# Patient Record
Sex: FEMALE | URBAN - METROPOLITAN AREA
[De-identification: names, ages, dates, MRNs, and addresses within clinical notes are randomized per-mention and may not be internally consistent; named-entity substitution may affect disease eponyms.]

---

## 2020-11-04 ENCOUNTER — APPOINTMENT (RX ONLY)
Dept: URBAN - METROPOLITAN AREA CLINIC 55 | Facility: CLINIC | Age: 30
Setting detail: DERMATOLOGY
End: 2020-11-04

## 2020-11-04 DIAGNOSIS — Z41.9 ENCOUNTER FOR PROCEDURE FOR PURPOSES OTHER THAN REMEDYING HEALTH STATE, UNSPECIFIED: ICD-10-CM

## 2020-11-04 PROCEDURE — ? DYSPORT

## 2020-11-04 NOTE — PROCEDURE: DYSPORT
Depressor Anguli Oris Units: 0
Show Right And Left Periorbital Units: No
Show Levator Superior Units: Yes
Lot #: [id number]
Detail Level: Detailed
Price (Use Numbers Only, No Special Characters Or $): 120
Glabellar Complex Units: 15
Dilution (U/0.1 Cc): 10
Expiration Date (Month Year): 6/30/21
Post-Care Instructions: Patient instructed to not lie down for 4 hours and limit physical activity for 24 hours. Follow up 7 days if needed.
Consent: Written consent obtained. Risks include but not limited to lid/brow ptosis, bruising, swelling, diplopia, temporary effect, incomplete chemical denervation.

## 2021-03-04 ENCOUNTER — APPOINTMENT (RX ONLY)
Dept: URBAN - METROPOLITAN AREA CLINIC 55 | Facility: CLINIC | Age: 31
Setting detail: DERMATOLOGY
End: 2021-03-04

## 2021-03-04 DIAGNOSIS — Z41.9 ENCOUNTER FOR PROCEDURE FOR PURPOSES OTHER THAN REMEDYING HEALTH STATE, UNSPECIFIED: ICD-10-CM

## 2021-03-04 PROCEDURE — ? DYSPORT

## 2021-03-04 NOTE — PROCEDURE: DYSPORT
Post-Care Instructions: Patient instructed to not lie down for 4 hours and limit physical activity for 24 hours. Follow up 7 days if needed.
Show Additional Area 5: Yes
Forehead Units: 10
Mentalis Units: 0
Expiration Date (Month Year): 9/30/2021
Lot #: [id number]
Detail Level: Detailed
Show Ucl Units: No
Consent: Written consent obtained. Risks include but not limited to lid/brow ptosis, bruising, swelling, diplopia, temporary effect, incomplete chemical denervation.
Additional Area 1 Location: lip
Glabellar Complex Units: 15

## 2021-06-10 ENCOUNTER — APPOINTMENT (RX ONLY)
Dept: URBAN - METROPOLITAN AREA CLINIC 55 | Facility: CLINIC | Age: 31
Setting detail: DERMATOLOGY
End: 2021-06-10

## 2021-06-10 DIAGNOSIS — Z41.9 ENCOUNTER FOR PROCEDURE FOR PURPOSES OTHER THAN REMEDYING HEALTH STATE, UNSPECIFIED: ICD-10-CM

## 2021-06-10 PROCEDURE — ? DYSPORT

## 2021-06-10 NOTE — PROCEDURE: DYSPORT
Additional Area 4 Units: 0
Show Right And Left Periorbital Units: No
Show Additional Area 5: Yes
Consent: Written consent obtained. Risks include but not limited to lid/brow ptosis, bruising, swelling, diplopia, temporary effect, incomplete chemical denervation.
Glabellar Complex Units: 15
Post-Care Instructions: Patient instructed to not lie down for 4 hours and limit physical activity for 24 hours. Follow up 7 days if needed.
Price (Use Numbers Only, No Special Characters Or $): 120
Dilution (U/0.1 Cc): 10
Expiration Date (Month Year): 2/28/2022
Lot #: [id number]
Additional Area 1 Location: lip
Detail Level: Detailed

## 2021-09-07 ENCOUNTER — APPOINTMENT (RX ONLY)
Dept: URBAN - METROPOLITAN AREA CLINIC 55 | Facility: CLINIC | Age: 31
Setting detail: DERMATOLOGY
End: 2021-09-07

## 2021-09-07 DIAGNOSIS — Z41.9 ENCOUNTER FOR PROCEDURE FOR PURPOSES OTHER THAN REMEDYING HEALTH STATE, UNSPECIFIED: ICD-10-CM

## 2021-09-07 PROCEDURE — ? DYSPORT

## 2021-09-07 NOTE — PROCEDURE: DYSPORT
Additional Area 5 Units: 0
Forehead Units: 10
Glabellar Complex Units: 15
Show Right And Left Brow Units: No
Additional Area 1 Location: lip
Show Additional Area 4: Yes
Expiration Date (Month Year): 5/31/2022
Detail Level: Detailed
Consent: Written consent obtained. Risks include but not limited to lid/brow ptosis, bruising, swelling, diplopia, temporary effect, incomplete chemical denervation.
Post-Care Instructions: Patient instructed to not lie down for 4 hours and limit physical activity for 24 hours. Follow up 7 days if needed.
Price (Use Numbers Only, No Special Characters Or $): 120
Lot #: [id number]

## 2021-11-04 ENCOUNTER — APPOINTMENT (RX ONLY)
Dept: URBAN - METROPOLITAN AREA CLINIC 55 | Facility: CLINIC | Age: 31
Setting detail: DERMATOLOGY
End: 2021-11-04

## 2021-11-04 DIAGNOSIS — Z41.9 ENCOUNTER FOR PROCEDURE FOR PURPOSES OTHER THAN REMEDYING HEALTH STATE, UNSPECIFIED: ICD-10-CM

## 2021-11-04 PROCEDURE — ? DYSPORT

## 2021-11-04 NOTE — PROCEDURE: DYSPORT
Show Additional Area 2: Yes
Additional Area 6 Units: 0
Show Lcl Units: No
Post-Care Instructions: Patient instructed to not lie down for 4 hours and limit physical activity for 24 hours. Follow up 7 days if needed.
Forehead Units: 10
Expiration Date (Month Year): 4/30/2022
Detail Level: Detailed
Consent: Written consent obtained. Risks include but not limited to lid/brow ptosis, bruising, swelling, diplopia, temporary effect, incomplete chemical denervation.
Additional Area 1 Location: lip
Glabellar Complex Units: 15
Lot #: [id number]
Price (Use Numbers Only, No Special Characters Or $): 120

## 2022-01-12 ENCOUNTER — APPOINTMENT (RX ONLY)
Dept: URBAN - METROPOLITAN AREA CLINIC 55 | Facility: CLINIC | Age: 32
Setting detail: DERMATOLOGY
End: 2022-01-12

## 2022-01-12 DIAGNOSIS — Z41.9 ENCOUNTER FOR PROCEDURE FOR PURPOSES OTHER THAN REMEDYING HEALTH STATE, UNSPECIFIED: ICD-10-CM

## 2022-01-12 PROCEDURE — ? DYSPORT

## 2022-01-12 NOTE — PROCEDURE: DYSPORT
Show Additional Area 3: Yes
R Brow Units: 0
Price (Use Numbers Only, No Special Characters Or $): 120
Show Ucl Units: No
Glabellar Complex Units: 15
Lot #: [id number]
Detail Level: Detailed
Post-Care Instructions: Patient instructed to not lie down for 4 hours and limit physical activity for 24 hours. Follow up 7 days if needed.
Dilution (U/0.1 Cc): 10
Consent: Written consent obtained. Risks include but not limited to lid/brow ptosis, bruising, swelling, diplopia, temporary effect, incomplete chemical denervation.
Additional Area 1 Location: lip
Expiration Date (Month Year): 9/22

## 2022-03-21 ENCOUNTER — APPOINTMENT (RX ONLY)
Dept: URBAN - METROPOLITAN AREA CLINIC 55 | Facility: CLINIC | Age: 32
Setting detail: DERMATOLOGY
End: 2022-03-21

## 2022-03-21 DIAGNOSIS — Z41.9 ENCOUNTER FOR PROCEDURE FOR PURPOSES OTHER THAN REMEDYING HEALTH STATE, UNSPECIFIED: ICD-10-CM

## 2022-03-21 PROCEDURE — ? DYSPORT

## 2022-03-21 NOTE — PROCEDURE: DYSPORT
Show Depressor Anguli Units: Yes
Additional Area 5 Units: 0
Show Ucl Units: No
Price (Use Numbers Only, No Special Characters Or $): 120
Forehead Units: 10
Detail Level: Detailed
Additional Area 1 Location: lip
Post-Care Instructions: Patient instructed to not lie down for 4 hours and limit physical activity for 24 hours. Follow up 7 days if needed.
Expiration Date (Month Year): 11/22
Lot #: [id number]
Glabellar Complex Units: 15
Consent: Written consent obtained. Risks include but not limited to lid/brow ptosis, bruising, swelling, diplopia, temporary effect, incomplete chemical denervation.

## 2022-05-05 ENCOUNTER — APPOINTMENT (RX ONLY)
Dept: URBAN - METROPOLITAN AREA CLINIC 58 | Facility: CLINIC | Age: 32
Setting detail: DERMATOLOGY
End: 2022-05-05

## 2022-05-05 DIAGNOSIS — L50.1 IDIOPATHIC URTICARIA: ICD-10-CM | Status: INADEQUATELY CONTROLLED

## 2022-05-05 PROCEDURE — ? COUNSELING

## 2022-05-05 PROCEDURE — 99203 OFFICE O/P NEW LOW 30 MIN: CPT

## 2022-05-05 PROCEDURE — ? TREATMENT REGIMEN

## 2022-05-05 ASSESSMENT — LOCATION ZONE DERM
LOCATION ZONE: LEG
LOCATION ZONE: ARM
LOCATION ZONE: HAND
LOCATION ZONE: NECK
LOCATION ZONE: TRUNK

## 2022-05-05 ASSESSMENT — LOCATION SIMPLE DESCRIPTION DERM
LOCATION SIMPLE: RIGHT FOREARM
LOCATION SIMPLE: LEFT FOREARM
LOCATION SIMPLE: LEFT HAND
LOCATION SIMPLE: ABDOMEN
LOCATION SIMPLE: RIGHT POSTERIOR THIGH
LOCATION SIMPLE: RIGHT HAND
LOCATION SIMPLE: LOWER BACK
LOCATION SIMPLE: CHEST
LOCATION SIMPLE: TRAPEZIAL NECK
LOCATION SIMPLE: UPPER BACK
LOCATION SIMPLE: LEFT POSTERIOR THIGH

## 2022-05-05 ASSESSMENT — LOCATION DETAILED DESCRIPTION DERM
LOCATION DETAILED: RIGHT DISTAL DORSAL FOREARM
LOCATION DETAILED: INFERIOR THORACIC SPINE
LOCATION DETAILED: PERIUMBILICAL SKIN
LOCATION DETAILED: RIGHT MEDIAL SUPERIOR CHEST
LOCATION DETAILED: RIGHT DISTAL LATERAL POSTERIOR THIGH
LOCATION DETAILED: LEFT MEDIAL SUPERIOR CHEST
LOCATION DETAILED: SUPERIOR LUMBAR SPINE
LOCATION DETAILED: LEFT DISTAL DORSAL FOREARM
LOCATION DETAILED: RIGHT RADIAL DORSAL HAND
LOCATION DETAILED: MID TRAPEZIAL NECK
LOCATION DETAILED: LEFT DISTAL POSTERIOR THIGH
LOCATION DETAILED: LEFT RADIAL DORSAL HAND

## 2022-05-05 NOTE — HPI: RASH
How Severe Is Your Rash?: moderate
Is This A New Presentation, Or A Follow-Up?: Rash
[Negative] : Heme/Lymph

## 2022-05-05 NOTE — PROCEDURE: TREATMENT REGIMEN
Otc Regimen: Start Allegra twice daily \\nFamotodine twice daily \\nSarna anti itch lotion with menthol, can apply as many times as needed
Detail Level: Zone
Discontinue Regimen: Steroids at this time
Plan: If she does not improve with conventional measures, will order blood tests \\nCall with update Monday\\nFollow up in 10 days
Continue Regimen: Restart TAC 0.1% cream twice daily to rashed areas

## 2022-05-11 ENCOUNTER — APPOINTMENT (RX ONLY)
Dept: URBAN - METROPOLITAN AREA CLINIC 58 | Facility: CLINIC | Age: 32
Setting detail: DERMATOLOGY
End: 2022-05-11

## 2022-05-11 DIAGNOSIS — L50.1 IDIOPATHIC URTICARIA: ICD-10-CM

## 2022-05-11 PROCEDURE — ? ORDER TESTS

## 2022-05-11 PROCEDURE — ? TREATMENT REGIMEN

## 2022-05-11 NOTE — PROCEDURE: TREATMENT REGIMEN
Plan: Spoke with the patient, lab req is being sent to [facility]. She will get labs drawn today. May do a biopsy at her office visit next week
Detail Level: Zone

## 2022-05-16 ENCOUNTER — APPOINTMENT (RX ONLY)
Dept: URBAN - METROPOLITAN AREA CLINIC 58 | Facility: CLINIC | Age: 32
Setting detail: DERMATOLOGY
End: 2022-05-16

## 2022-05-16 DIAGNOSIS — L50.1 IDIOPATHIC URTICARIA: ICD-10-CM | Status: WELL CONTROLLED

## 2022-05-16 PROCEDURE — ? TREATMENT REGIMEN

## 2022-05-16 PROCEDURE — ? DIAGNOSIS COMMENT

## 2022-05-16 PROCEDURE — 99213 OFFICE O/P EST LOW 20 MIN: CPT

## 2022-05-16 NOTE — PROCEDURE: TREATMENT REGIMEN
Plan: If this recurs she should restart the Allegra. She can follow-up if this recurs
Detail Level: Zone

## 2022-06-30 ENCOUNTER — APPOINTMENT (RX ONLY)
Dept: URBAN - METROPOLITAN AREA CLINIC 55 | Facility: CLINIC | Age: 32
Setting detail: DERMATOLOGY
End: 2022-06-30

## 2022-06-30 DIAGNOSIS — Z41.9 ENCOUNTER FOR PROCEDURE FOR PURPOSES OTHER THAN REMEDYING HEALTH STATE, UNSPECIFIED: ICD-10-CM

## 2022-06-30 PROCEDURE — ? DYSPORT

## 2022-06-30 NOTE — PROCEDURE: DYSPORT
Show Depressor Anguli Units: Yes
R Brow Units: 0
Consent: Written consent obtained. Risks include but not limited to lid/brow ptosis, bruising, swelling, diplopia, temporary effect, incomplete chemical denervation.
Additional Area 5 Location: right lateral brow
Show Mentalis Units: No
Forehead Units: 20
Additional Area 2 Location: Upper lip
Dilution (U/0.1 Cc): 10
Post-Care Instructions: Patient instructed to not lie down for 4 hours and limit physical activity for 24 hours.
Additional Area 4 Location: left lateral 
Glabellar Complex Units: 30
Lot #: [id number]
Expiration Date (Month Year): 12/31/2022
Detail Level: Detailed
Additional Area 3 Location: Chin
Additional Area 6 Location: nasalis

## 2022-10-15 ENCOUNTER — APPOINTMENT (RX ONLY)
Dept: URBAN - METROPOLITAN AREA CLINIC 55 | Facility: CLINIC | Age: 32
Setting detail: DERMATOLOGY
End: 2022-10-15

## 2022-10-15 DIAGNOSIS — Z41.9 ENCOUNTER FOR PROCEDURE FOR PURPOSES OTHER THAN REMEDYING HEALTH STATE, UNSPECIFIED: ICD-10-CM

## 2022-10-15 PROCEDURE — ? DYSPORT

## 2022-10-15 NOTE — PROCEDURE: DYSPORT
Show Levator Superior Units: Yes
[facility] Units: 0
Show Inventory Tab: Show
Show Right And Left Brow Units: No
Consent: Verbal consent obtained. Risks include but not limited to lid/brow ptosis, bruising, swelling, diplopia, temporary effect, incomplete chemical denervation.
Dilution (U/0.1 Cc): 10
Additional Area 1 Location: Upper lip
Additional Area 2 Location: chin
Post-Care Instructions: Patient instructed to not lie down for 4 hours and limit physical activity for 24 hours.
Detail Level: Detailed
Forehead Units: 20
Glabellar Complex Units: 30

## 2023-02-25 ENCOUNTER — APPOINTMENT (RX ONLY)
Dept: URBAN - METROPOLITAN AREA CLINIC 55 | Facility: CLINIC | Age: 33
Setting detail: DERMATOLOGY
End: 2023-02-25

## 2023-02-25 DIAGNOSIS — Z41.9 ENCOUNTER FOR PROCEDURE FOR PURPOSES OTHER THAN REMEDYING HEALTH STATE, UNSPECIFIED: ICD-10-CM

## 2023-02-25 PROCEDURE — ? DYSPORT

## 2023-02-25 PROCEDURE — ? INVENTORY

## 2023-02-25 NOTE — PROCEDURE: DYSPORT
Additional Area 3 Units: 0
Show Additional Area 4: Yes
Show Right And Left Periorbital Units: No
Periorbital Skin Units: 20
Dilution (U/0.1 Cc): 10
Consent obtained. Risks include but not limited to lid/brow ptosis, bruising, swelling, diplopia, temporary effect, incomplete chemical denervation.
Detail Level: Detailed
Additional Area 1 Location: lip
Post-Care Instructions: Patient instructed to not lie down for 4 hours and limit physical activity for 24 hours.
Glabellar Complex Units: 30
Show Inventory Tab: Show

## 2023-05-02 ENCOUNTER — APPOINTMENT (RX ONLY)
Dept: URBAN - METROPOLITAN AREA CLINIC 55 | Facility: CLINIC | Age: 33
Setting detail: DERMATOLOGY
End: 2023-05-02

## 2023-05-02 DIAGNOSIS — Z41.9 ENCOUNTER FOR PROCEDURE FOR PURPOSES OTHER THAN REMEDYING HEALTH STATE, UNSPECIFIED: ICD-10-CM

## 2023-05-02 PROCEDURE — ? INVENTORY

## 2023-05-02 PROCEDURE — ? DYSPORT

## 2023-05-02 NOTE — PROCEDURE: DYSPORT
Show Additional Area 5: Yes
Lcl Root Units: 0
Show Right And Left Periorbital Units: No
Dilution (U/0.1 Cc): 10
Glabellar Complex Units: 30
Consent obtained. Risks include but not limited to lid/brow ptosis, bruising, swelling, diplopia, temporary effect, incomplete chemical denervation.
Additional Area 1 Location: lip
Post-Care Instructions: Patient instructed to not lie down for 4 hours and limit physical activity for 24 hours.
Forehead Units: 20
Detail Level: Detailed
Show Inventory Tab: Show

## 2023-07-27 ENCOUNTER — APPOINTMENT (RX ONLY)
Dept: URBAN - METROPOLITAN AREA CLINIC 55 | Facility: CLINIC | Age: 33
Setting detail: DERMATOLOGY
End: 2023-07-27

## 2023-07-27 DIAGNOSIS — Z41.9 ENCOUNTER FOR PROCEDURE FOR PURPOSES OTHER THAN REMEDYING HEALTH STATE, UNSPECIFIED: ICD-10-CM

## 2023-07-27 PROCEDURE — ? DYSPORT

## 2023-07-27 PROCEDURE — ? INVENTORY

## 2023-07-27 PROCEDURE — ? COSMETIC CONSULTATION: GENERAL

## 2023-07-27 NOTE — PROCEDURE: DYSPORT
Show Orbicularis Oculi Units: Yes
Periorbital Skin Units: 20
Depressor Anguli Oris Units: 0
Show Right And Left Periorbital Units: No
Detail Level: Detailed
Show Inventory Tab: Show
Dilution (U/0.1 Cc): 10
Glabellar Complex Units: 30
Additional Area 1 Location: lip
Consent obtained. Risks include but not limited to lid/brow ptosis, bruising, swelling, diplopia, temporary effect, incomplete chemical denervation.
Post-Care Instructions: Patient instructed to not lie down for 4 hours and limit physical activity for 24 hours.

## 2023-08-02 ENCOUNTER — APPOINTMENT (RX ONLY)
Dept: URBAN - METROPOLITAN AREA CLINIC 55 | Facility: CLINIC | Age: 33
Setting detail: DERMATOLOGY
End: 2023-08-02

## 2023-08-02 DIAGNOSIS — Z41.9 ENCOUNTER FOR PROCEDURE FOR PURPOSES OTHER THAN REMEDYING HEALTH STATE, UNSPECIFIED: ICD-10-CM

## 2023-08-02 PROCEDURE — ? FILLERS

## 2023-08-02 PROCEDURE — ? INVENTORY

## 2023-08-02 NOTE — PROCEDURE: FILLERS
Include Cannula Information In Note?: Yes
Detail Level: Detailed
Vermilion Lips Filler Volume In Cc: 0
Include Cannula Information In Note?: No
Anesthesia Volume In Cc: 0.5
Consent: Written consent obtained. Risks include but not limited to bruising, beading, irregular texture, ulceration, infection, allergic reaction, scar formation, incomplete augmentation, temporary nature, and procedural pain.
Post-Care Instructions: After the procedure, patient instructed to apply ice to reduce swelling.
Additional Anesthesia Volume In Cc: 6
Include Cannula Size?: 27G
Map Statment: See Attach Map for Complete Details
Filler: Restylane Kysse
Aspiration Statement: Aspiration was performed prior to injecting site with filler.
Anesthesia Type: 1% lidocaine with epinephrine

## 2023-08-10 ENCOUNTER — APPOINTMENT (RX ONLY)
Dept: URBAN - METROPOLITAN AREA CLINIC 55 | Facility: CLINIC | Age: 33
Setting detail: DERMATOLOGY
End: 2023-08-10

## 2023-08-10 DIAGNOSIS — Z41.9 ENCOUNTER FOR PROCEDURE FOR PURPOSES OTHER THAN REMEDYING HEALTH STATE, UNSPECIFIED: ICD-10-CM

## 2023-08-10 PROCEDURE — ? FILLERS

## 2023-08-10 PROCEDURE — ? INVENTORY

## 2023-08-10 NOTE — PROCEDURE: FILLERS
Mid Face Filler Volume In Cc: 0
Include Cannula Information In Note?: No
Detail Level: Detailed
Filler: Restylane Kysse
Include Cannula Size?: 27G
Additional Anesthesia Volume In Cc: 6
Include Cannula Information In Note?: Yes
Aspiration Statement: Aspiration was performed prior to injecting site with filler.
Anesthesia Volume In Cc: 0.5
Map Statment: See Attach Map for Complete Details
Anesthesia Type: 1% lidocaine with epinephrine
Post-Care Instructions: After the procedure, patient instructed to apply ice to reduce swelling.
Consent: Written consent obtained. Risks include but not limited to bruising, beading, irregular texture, ulceration, infection, allergic reaction, scar formation, incomplete augmentation, temporary nature, and procedural pain.

## 2023-10-05 ENCOUNTER — APPOINTMENT (RX ONLY)
Dept: URBAN - METROPOLITAN AREA CLINIC 55 | Facility: CLINIC | Age: 33
Setting detail: DERMATOLOGY
End: 2023-10-05

## 2023-10-05 DIAGNOSIS — Z41.9 ENCOUNTER FOR PROCEDURE FOR PURPOSES OTHER THAN REMEDYING HEALTH STATE, UNSPECIFIED: ICD-10-CM

## 2023-10-05 PROCEDURE — ? DYSPORT

## 2023-10-05 NOTE — PROCEDURE: DYSPORT
Show Ucl Units: No
Post-Care Instructions: Patient instructed to not lie down for 4 hours and limit physical activity for 24 hours.
Inferior Lateral Orbicularis Oculi Units: 0
Glabellar Complex Units: 30
Show Levator Superior Units: Yes
Show Inventory Tab: Show
Additional Area 2 Location: chin
Periorbital Skin Units: 20
Detail Level: Detailed
Consent: Verbal consent obtained. Risks include but not limited to lid/brow ptosis, bruising, swelling, diplopia, temporary effect, incomplete chemical denervation.
Dilution (U/0.1 Cc): 10
Additional Area 1 Location: Upper lip

## 2023-12-20 ENCOUNTER — APPOINTMENT (RX ONLY)
Dept: URBAN - METROPOLITAN AREA CLINIC 55 | Facility: CLINIC | Age: 33
Setting detail: DERMATOLOGY
End: 2023-12-20

## 2023-12-20 DIAGNOSIS — Z41.9 ENCOUNTER FOR PROCEDURE FOR PURPOSES OTHER THAN REMEDYING HEALTH STATE, UNSPECIFIED: ICD-10-CM

## 2023-12-20 PROCEDURE — ? DYSPORT

## 2023-12-20 NOTE — PROCEDURE: DYSPORT
Nasal Root Units: 0
Show Lateral Platysmal Band Units: Yes
Detail Level: Detailed
Show Inventory Tab: Show
Show Right And Left Pupillary Line Units: No
Glabellar Complex Units: 30
Periorbital Skin Units: 20
Additional Area 1 Location: Upper lip
Additional Area 2 Location: chin
Consent: Verbal consent obtained. Risks include but not limited to lid/brow ptosis, bruising, swelling, diplopia, temporary effect, incomplete chemical denervation.
Post-Care Instructions: Patient instructed to not lie down for 4 hours and limit physical activity for 24 hours.
Dilution (U/0.1 Cc): 10

## 2024-03-14 ENCOUNTER — APPOINTMENT (RX ONLY)
Dept: URBAN - METROPOLITAN AREA CLINIC 55 | Facility: CLINIC | Age: 34
Setting detail: DERMATOLOGY
End: 2024-03-14

## 2024-03-14 DIAGNOSIS — Z41.9 ENCOUNTER FOR PROCEDURE FOR PURPOSES OTHER THAN REMEDYING HEALTH STATE, UNSPECIFIED: ICD-10-CM

## 2024-03-14 PROCEDURE — ? DYSPORT

## 2024-03-14 NOTE — PROCEDURE: DYSPORT
Additional Area 1 Units: 0
Detail Level: Detailed
Show Orbicularis Oculi Units: Yes
Additional Area 2 Location: chin
Show Right And Left Pupillary Line Units: No
Show Inventory Tab: Show
Dilution (U/0.1 Cc): 10
Periorbital Skin Units: 20
Consent: Verbal consent obtained. Risks include but not limited to lid/brow ptosis, bruising, swelling, diplopia, temporary effect, incomplete chemical denervation.
Post-Care Instructions: Patient instructed to not lie down for 4 hours and limit physical activity for 24 hours.
Additional Area 1 Location: Upper lip
Glabellar Complex Units: 30

## 2024-06-03 ENCOUNTER — APPOINTMENT (RX ONLY)
Dept: URBAN - METROPOLITAN AREA CLINIC 55 | Facility: CLINIC | Age: 34
Setting detail: DERMATOLOGY
End: 2024-06-03

## 2024-06-03 DIAGNOSIS — Z41.9 ENCOUNTER FOR PROCEDURE FOR PURPOSES OTHER THAN REMEDYING HEALTH STATE, UNSPECIFIED: ICD-10-CM

## 2024-06-03 PROCEDURE — ? DYSPORT

## 2024-06-03 NOTE — PROCEDURE: DYSPORT
Lcl Root Units: 0
Show Right And Left Pupillary Line Units: No
Forehead Units: 20
Show Masseter Units: Yes
Additional Area 1 Location: Upper lip
Glabellar Complex Units: 30
Detail Level: Detailed
Show Inventory Tab: Show
Additional Area 2 Location: chin
Consent: Verbal consent obtained. Risks include but not limited to lid/brow ptosis, bruising, swelling, diplopia, temporary effect, incomplete chemical denervation.
Post-Care Instructions: Patient instructed to not lie down for 4 hours and limit physical activity for 24 hours.
Dilution (U/0.1 Cc): 10

## 2024-08-19 ENCOUNTER — APPOINTMENT (RX ONLY)
Dept: URBAN - METROPOLITAN AREA CLINIC 55 | Facility: CLINIC | Age: 34
Setting detail: DERMATOLOGY
End: 2024-08-19

## 2024-08-19 DIAGNOSIS — Z41.9 ENCOUNTER FOR PROCEDURE FOR PURPOSES OTHER THAN REMEDYING HEALTH STATE, UNSPECIFIED: ICD-10-CM

## 2024-08-19 PROCEDURE — ? DYSPORT

## 2024-11-20 ENCOUNTER — APPOINTMENT (RX ONLY)
Dept: URBAN - METROPOLITAN AREA CLINIC 55 | Facility: CLINIC | Age: 34
Setting detail: DERMATOLOGY
End: 2024-11-20

## 2024-11-20 DIAGNOSIS — Z41.9 ENCOUNTER FOR PROCEDURE FOR PURPOSES OTHER THAN REMEDYING HEALTH STATE, UNSPECIFIED: ICD-10-CM

## 2024-11-20 PROCEDURE — ? DYSPORT

## 2024-11-20 NOTE — PROCEDURE: DYSPORT
Right Pupillary Line Units: 0
Show Orbicularis Oculi Units: Yes
Glabellar Complex Units: 30
Forehead Units: 20
Post-Care Instructions: Patient instructed to not lie down for 4 hours and limit physical activity for 24 hours.
Additional Area 2 Location: chin
Show Right And Left Pupillary Line Units: No
Detail Level: Detailed
Consent: Verbal consent obtained. Risks include but not limited to lid/brow ptosis, bruising, swelling, diplopia, temporary effect, incomplete chemical denervation.
Dilution (U/0.1 Cc): 10
Additional Area 1 Location: Upper lip
Additional Area 3 Location: DLI
Show Inventory Tab: Show

## 2025-02-03 ENCOUNTER — APPOINTMENT (OUTPATIENT)
Dept: URBAN - METROPOLITAN AREA CLINIC 55 | Facility: CLINIC | Age: 35
Setting detail: DERMATOLOGY
End: 2025-02-03

## 2025-02-03 DIAGNOSIS — Z41.9 ENCOUNTER FOR PROCEDURE FOR PURPOSES OTHER THAN REMEDYING HEALTH STATE, UNSPECIFIED: ICD-10-CM

## 2025-02-03 PROCEDURE — ? INVENTORY

## 2025-02-03 PROCEDURE — ? DYSPORT

## 2025-02-03 PROCEDURE — ? COSMETIC CONSULTATION: IPL

## 2025-02-03 ASSESSMENT — LOCATION SIMPLE DESCRIPTION DERM: LOCATION SIMPLE: NOSE

## 2025-02-03 ASSESSMENT — LOCATION ZONE DERM: LOCATION ZONE: NOSE

## 2025-02-03 ASSESSMENT — LOCATION DETAILED DESCRIPTION DERM: LOCATION DETAILED: NASAL DORSUM

## 2025-02-03 NOTE — PROCEDURE: DYSPORT
Levator Labii Superioris Units: 0
Show Additional Area 1: Yes
Show Ucl Units: No
Consent obtained and risk reviewed.
Post-Care Instructions: Patient instructed to not lie down flat for 4 hours or rub the treatment area.
Detail Level: Detailed
Forehead Units: 20
Dilution (U/0.1 Cc): 10
Glabellar Complex Units: 30
Show Inventory Tab: Show
Additional Area 1 Location: upper lip cutaneous
Additional Area 2 Location: chin

## 2025-04-14 ENCOUNTER — APPOINTMENT (OUTPATIENT)
Dept: URBAN - METROPOLITAN AREA CLINIC 55 | Facility: CLINIC | Age: 35
Setting detail: DERMATOLOGY
End: 2025-04-14

## 2025-04-14 DIAGNOSIS — Z41.9 ENCOUNTER FOR PROCEDURE FOR PURPOSES OTHER THAN REMEDYING HEALTH STATE, UNSPECIFIED: ICD-10-CM

## 2025-04-14 PROCEDURE — ? DAXXIFY

## 2025-04-14 PROCEDURE — ? INVENTORY

## 2025-04-14 NOTE — PROCEDURE: DAXXIFY
Show Forehead Units: Yes
Forehead Units: 28
Additional Area 2 Units: 0
Show Right And Left Pupillary Line Units: No
Detail Level: Detailed
Dilution (U/0.1 Cc): 8
Show Inventory Tab: Show
Comments: Make up was removed from treatment area and area was prepper with 70% isopropyl alcohol.
Consent: Written consent and verbal obtained. Risk reviewed.
Additional Area 1 Location: upper cutaneous lip
Post-Care Instructions: Patient instructed to not lie down for 4 hours and limit physical activity for 24 hours.
Additional Area 2 Location: lower lip
Bill Summary Price Listed Below, Or Bill Total Of Units X Price Per Unit?: Bill Summary Price Below

## 2025-06-30 ENCOUNTER — APPOINTMENT (OUTPATIENT)
Dept: URBAN - METROPOLITAN AREA CLINIC 55 | Facility: CLINIC | Age: 35
Setting detail: DERMATOLOGY
End: 2025-06-30

## 2025-06-30 DIAGNOSIS — Z41.9 ENCOUNTER FOR PROCEDURE FOR PURPOSES OTHER THAN REMEDYING HEALTH STATE, UNSPECIFIED: ICD-10-CM

## 2025-06-30 PROCEDURE — ? DYSPORT

## 2025-06-30 PROCEDURE — ? DAXXIFY

## 2025-06-30 PROCEDURE — ? INVENTORY

## 2025-06-30 NOTE — PROCEDURE: DAXXIFY
Price Per Unit In $ (Use Numbers Only, No Text Please.): 0
Show Depressor Anguli Units: Yes
Show Right And Left Brow Units: No
Dilution (U/0.1 Cc): 8
Show Inventory Tab: Show
Comments: Make up was removed from treatment area and area was prepper with 70% isopropyl alcohol.
Consent: Written consent and verbal obtained. Risk reviewed.
Additional Area 1 Location: upper cutaneous lip
Post-Care Instructions: Patient instructed to not lie down for 4 hours and limit physical activity for 24 hours.
Bill Summary Price Listed Below, Or Bill Total Of Units X Price Per Unit?: Bill Summary Price Below
Additional Area 2 Location: lower lip
Forehead Units: 28
Detail Level: Detailed

## 2025-06-30 NOTE — PROCEDURE: DYSPORT
Show Additional Area 3: Yes
Show Inventory Tab: Show
Additional Area 4 Units: 0
Show Right And Left Pupillary Line Units: No
Additional Area 1 Location: upper lip cutaneous
Glabellar Complex Units: 30
Additional Area 2 Location: chin
Consent obtained and risk reviewed.
Post-Care Instructions: Patient instructed to not lie down flat for 4 hours or rub the treatment area.
Detail Level: Detailed
Additional Area 3 Location: jawline
Periorbital Skin Units: 20
Dilution (U/0.1 Cc): 10